# Patient Record
Sex: FEMALE | Race: WHITE
[De-identification: names, ages, dates, MRNs, and addresses within clinical notes are randomized per-mention and may not be internally consistent; named-entity substitution may affect disease eponyms.]

---

## 2022-09-21 ENCOUNTER — HOSPITAL ENCOUNTER (EMERGENCY)
Dept: HOSPITAL 47 - EC | Age: 37
Discharge: HOME | End: 2022-09-21
Payer: COMMERCIAL

## 2022-09-21 VITALS
SYSTOLIC BLOOD PRESSURE: 124 MMHG | HEART RATE: 81 BPM | RESPIRATION RATE: 18 BRPM | TEMPERATURE: 98.1 F | DIASTOLIC BLOOD PRESSURE: 82 MMHG
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
## 2022-09-21 DIAGNOSIS — S51.011A: Primary | ICD-10-CM
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
## 2022-09-21 DIAGNOSIS — S93.402A: ICD-10-CM

## 2022-09-21 PROCEDURE — 99283 EMERGENCY DEPT VISIT LOW MDM: CPT

## 2022-09-21 PROCEDURE — 12001 RPR S/N/AX/GEN/TRNK 2.5CM/<: CPT

## 2022-09-21 NOTE — XR
EXAMINATION TYPE: XR ankle complete LT

 

DATE OF EXAM: 9/21/2022 4:59 PM

 

INDICATION: 

Patient age:Female;  37 years old; 

Reason for study: fall; 

 

COMPARISON: None

 

TECHNIQUE: The  left ankle is imaged in  frontal, lateral and oblique projections.

 

FINDINGS:

Remote injury to the lateral talar head. Cortication. No evidence of acute fracture identified. There
 is soft tissue swelling around the ankle.

 

IMPRESSION: 

1. No evidence of acute fracture.

2. Subcutaneous swelling around the ankle likely secondary to underlying soft tissue injury.

3. Remote injury to the fibular head suggested.

## 2022-09-21 NOTE — ED
General Adult HPI





- General


Chief complaint: Extremity Injury, Upper


Stated complaint: fall, elbow injury


Time Seen by Provider: 09/21/22 16:31


Source: patient, RN notes reviewed, old records reviewed


Mode of arrival: ambulatory


Limitations: no limitations





- History of Present Illness


Initial comments: 





37-year-old female presenting for evaluation of laceration, injury to the right 

elbow, and left ankle.  Patient states she slipped while cleaning a bathroom 

onto brick floor.  She injured her right elbow with laceration and twisted her 

left ankle.  She had gone to see her primary care physician who had begun to 

place a suture in the elbow laceration but developed a large hematoma and could 

not provide hemorrhage control and sent the patient to the emergency department 

for evaluation.  She had a pressure dressing applied upon arrival.  She does 

have pain in the elbow and left ankle.  She previous fracture of the left ankle 

in the remote past.





- Related Data


                                    Allergies











Allergy/AdvReac Type Severity Reaction Status Date / Time


 


No Known Allergies Allergy   Verified 09/21/22 16:24














Review of Systems


ROS Statement: 


Those systems with pertinent positive or pertinent negative responses have been 

documented in the HPI.





ROS Other: All systems not noted in ROS Statement are negative.





Past Medical History


Past Medical History: No Reported History


History of Any Multi-Drug Resistant Organisms: None Reported


Past Surgical History: Tubal Ligation


Past Psychological History: No Psychological Hx Reported


Smoking Status: Never smoker


Past Alcohol Use History: None Reported


Past Drug Use History: Marijuana





General Exam


Limitations: no limitations


General appearance: alert, in no apparent distress


Head exam: Present: atraumatic, normocephalic


Eye exam: Present: normal appearance, PERRL


ENT exam: Present: normal exam


Neck exam: Present: normal inspection.  Absent: tenderness, meningismus


Respiratory exam: Present: normal lung sounds bilaterally.  Absent: respiratory 

distress, wheezes


Cardiovascular Exam: Present: regular rate, normal rhythm


GI/Abdominal exam: Absent: distended


Extremities exam: Present: joint swelling (Right posterior elbow hematoma with 1

cm stellate laceration, no active bleeding, left ankle has soft tissue swelling 

lateral malleolus no significant deformity)





Course


                                   Vital Signs











  09/21/22





  16:21


 


Temperature 98 F


 


Pulse Rate 84


 


Respiratory 16





Rate 


 


Blood Pressure 119/79


 


O2 Sat by Pulse 100





Oximetry 














Procedures





- Laceration


  ** Laceration #1


Consent Obtained: verbal consent


Indication: laceration


Site: upper extremity


Size (cm): 1


Description: stellate


Depth: arterial injury, through-and-through


Anesthetic Used: lidocaine 2%, with epi


Anesthesia Technique: local infiltration


Amount (mls): 3


Pre-repair: wound explored, irrigated extensively


Type of Sutures: nylon


Size of Sutures: 5-0


Number of Sutures: 2


Technique: simple, interrupted


Patient Tolerated Procedure: well





Medical Decision Making





- Medical Decision Making





37-year-old female with right elbow injury left ankle injury.  X-ray of the left

ankle shows a chronic deformity of the distal fibula without acute fracture.  

Patient placed in an Ace wrap regarding the left ankle.  Regarding her right 

elbow she had a hematoma and 1 cm stellate laceration over the olecranon 

process.  This was irrigated, anesthetized with lidocaine and epinephrine 

hemorrhage control was achieved.  It was cleansed with Betadine and tap water.  

2 nylon sutures were performed.  The x-ray did not show a fracture.  Patient 

will follow-up with primary care physician and have sutures removed in 10-14 

days.





Disposition


Clinical Impression: 


 Ankle sprain, Elbow contusion, Laceration





Disposition: HOME SELF-CARE


Condition: Good


Instructions (If sedation given, give patient instructions):  Ankle Sprain (ED),

Laceration (ED)


Additional Instructions: 


Please monitor elbow for signs of infection, please return for suture removal in

10-14 days.


Is patient prescribed a controlled substance at d/c from ED?: No


Referrals: 


Cristy Wynne DO [Primary Care Provider] - 1-2 days


Time of Disposition: 17:30

## 2022-09-21 NOTE — XR
EXAMINATION TYPE: XR elbow complete RT

 

DATE OF EXAM: 9/21/2022 4:59 PM

 

INDICATION: 

Patient age:Female;  37 years old; 

Reason for study: fall; [facility]. 

 

COMPARISON: None

 

TECHNIQUE: The right elbow was examined in AP, lateral, and oblique projections. 

 

FINDINGS: There is soft tissue swelling over the olecranon process. No evidence of acute fracture.

 

IMPRESSION:

Soft tissue swelling over the olecranon without definite evidence for fracture. If there remains conc
dale consider CT evaluation.

## 2023-01-19 ENCOUNTER — HOSPITAL ENCOUNTER (EMERGENCY)
Dept: HOSPITAL 47 - EC | Age: 38
Discharge: HOME | End: 2023-01-19
Payer: COMMERCIAL

## 2023-01-19 VITALS — SYSTOLIC BLOOD PRESSURE: 106 MMHG | DIASTOLIC BLOOD PRESSURE: 63 MMHG | HEART RATE: 77 BPM

## 2023-01-19 VITALS — TEMPERATURE: 97.9 F

## 2023-01-19 VITALS — RESPIRATION RATE: 18 BRPM

## 2023-01-19 DIAGNOSIS — F12.90: ICD-10-CM

## 2023-01-19 DIAGNOSIS — N13.2: Primary | ICD-10-CM

## 2023-01-19 LAB
ALBUMIN SERPL-MCNC: 4.1 G/DL (ref 3.5–5)
ALP SERPL-CCNC: 47 U/L (ref 38–126)
ALT SERPL-CCNC: 23 U/L (ref 4–34)
AMYLASE SERPL-CCNC: 48 U/L (ref 30–110)
ANION GAP SERPL CALC-SCNC: 7 MMOL/L
AST SERPL-CCNC: 27 U/L (ref 14–36)
BASOPHILS # BLD AUTO: 0 K/UL (ref 0–0.2)
BASOPHILS NFR BLD AUTO: 0 %
BUN SERPL-SCNC: 21 MG/DL (ref 7–17)
CALCIUM SPEC-MCNC: 8.8 MG/DL (ref 8.4–10.2)
CHLORIDE SERPL-SCNC: 107 MMOL/L (ref 98–107)
CO2 SERPL-SCNC: 24 MMOL/L (ref 22–30)
EOSINOPHIL # BLD AUTO: 0.2 K/UL (ref 0–0.7)
EOSINOPHIL NFR BLD AUTO: 3 %
ERYTHROCYTE [DISTWIDTH] IN BLOOD BY AUTOMATED COUNT: 4.38 M/UL (ref 3.8–5.4)
ERYTHROCYTE [DISTWIDTH] IN BLOOD: 12.6 % (ref 11.5–15.5)
GLUCOSE SERPL-MCNC: 121 MG/DL (ref 74–99)
HCT VFR BLD AUTO: 39 % (ref 34–46)
HGB BLD-MCNC: 13.1 GM/DL (ref 11.4–16)
LIPASE SERPL-CCNC: 104 U/L (ref 23–300)
LYMPHOCYTES # SPEC AUTO: 1.9 K/UL (ref 1–4.8)
LYMPHOCYTES NFR SPEC AUTO: 33 %
MCH RBC QN AUTO: 29.9 PG (ref 25–35)
MCHC RBC AUTO-ENTMCNC: 33.6 G/DL (ref 31–37)
MCV RBC AUTO: 89.1 FL (ref 80–100)
MONOCYTES # BLD AUTO: 0.3 K/UL (ref 0–1)
MONOCYTES NFR BLD AUTO: 5 %
NEUTROPHILS # BLD AUTO: 3.3 K/UL (ref 1.3–7.7)
NEUTROPHILS NFR BLD AUTO: 58 %
PH UR: 5.5 [PH] (ref 5–8)
PLATELET # BLD AUTO: 203 K/UL (ref 150–450)
POTASSIUM SERPL-SCNC: 4.3 MMOL/L (ref 3.5–5.1)
PROT SERPL-MCNC: 6.7 G/DL (ref 6.3–8.2)
PROT UR QL: (no result)
RBC UR QL: 11 /HPF (ref 0–5)
SODIUM SERPL-SCNC: 138 MMOL/L (ref 137–145)
SP GR UR: 1.04 (ref 1–1.03)
SQUAMOUS UR QL AUTO: 27 /HPF (ref 0–4)
UROBILINOGEN UR QL STRIP: <2 MG/DL (ref ?–2)
WBC # BLD AUTO: 5.8 K/UL (ref 3.8–10.6)
WBC # UR AUTO: 28 /HPF (ref 0–5)

## 2023-01-19 PROCEDURE — 81001 URINALYSIS AUTO W/SCOPE: CPT

## 2023-01-19 PROCEDURE — 96375 TX/PRO/DX INJ NEW DRUG ADDON: CPT

## 2023-01-19 PROCEDURE — 96374 THER/PROPH/DIAG INJ IV PUSH: CPT

## 2023-01-19 PROCEDURE — 84703 CHORIONIC GONADOTROPIN ASSAY: CPT

## 2023-01-19 PROCEDURE — 82150 ASSAY OF AMYLASE: CPT

## 2023-01-19 PROCEDURE — 85025 COMPLETE CBC W/AUTO DIFF WBC: CPT

## 2023-01-19 PROCEDURE — 96361 HYDRATE IV INFUSION ADD-ON: CPT

## 2023-01-19 PROCEDURE — 74176 CT ABD & PELVIS W/O CONTRAST: CPT

## 2023-01-19 PROCEDURE — 87086 URINE CULTURE/COLONY COUNT: CPT

## 2023-01-19 PROCEDURE — 83690 ASSAY OF LIPASE: CPT

## 2023-01-19 PROCEDURE — 99284 EMERGENCY DEPT VISIT MOD MDM: CPT

## 2023-01-19 PROCEDURE — 80053 COMPREHEN METABOLIC PANEL: CPT

## 2023-01-19 PROCEDURE — 36415 COLL VENOUS BLD VENIPUNCTURE: CPT

## 2023-01-19 NOTE — CT
EXAMINATION TYPE: CT abdomen pelvis wo con

 

DATE OF EXAM: 1/19/2023

 

COMPARISON: None

 

INDICATION: abd pain

 

DLP: 537.8 mGycm, Automated exposure control for dose reduction was used.

 

CONTRAST:  0 mL of . 

                        Study performed without Oral Contrast

 

TECHNIQUE: Axial images were obtained from above the diaphragm to the pubic rami in the axial plane a
t 5 mm thick sections.  Reconstructed images are reviewed on the computer in the coronal plane.  

 

FINDINGS:

 

Limited CT sections are obtained the lung bases.  The lung bases are clear.

 

CT ABDOMEN:

 

Liver: Normal

 

Spleen: Normal

 

Pancreas: Normal

 

Adrenal glands: The adrenal glands are normal.

 

Gallbladder: Normal  

 

Kidneys: No masses are evident. No hydronephrosis is present.   No cysts are present.  There are philipp
ral fine calcifications within the cortical medullary junction of the left kidney. Minimal left hydro
nephrosis may be present. Some minimal perinephric fluid may be present between the kidney and psoas 
muscle. At the left ureterovesical junction there is a small 0.3 rounded calcification could be a dis
nimesh ureteral stone. There are additional phleboliths within the pelvis which are within the different
ial. Correlate with symptoms

 

Aorta: Vascular calcification is within the aorta. 

 

Inferior vena cava: Normal.

 

CT PELVIS: 

Fecal debris is:. No dilated small bowel loops are evident. Study as well as oral contrast material i
n bowel evaluation.

 

Appendix: Normal as visualized.

 

Urinary bladder: Decompressed limiting evaluation. 

 

Genitourinary structures: Uterus is normal. Adnexa are normal.

 

Osseous structures: No suspicious lytic or sclerotic lesions. Small bone island may be within the rig
ht femoral head lateral right sacrum.

 

IMPRESSIONS:

1.  Several punctate left-sided renal stones. Correlate for medullary sponge kidney.

2. Mild left hydronephrosis. An obstructing calcification is not identified. Distal left 0.3 cm urete
ral stone is not excluded. Phleboliths are within the differential.

## 2023-01-19 NOTE — ED
General Adult HPI





<Marbin Gallego - Last Filed: 01/19/23 09:10>





- General


Source: patient, RN notes reviewed, old records reviewed


Mode of arrival: ambulatory


Limitations: no limitations





<John Olmos - Last Filed: 01/19/23 21:24>





- General


Chief complaint: Abdominal Pain


Stated complaint: Abdominal Pain, Kidney Stones


Time Seen by Provider: 01/19/23 05:40





- History of Present Illness


Initial comments: 





Patient is a 37-year-old female presents emergency Department complaining of 

right flank pain.  She does have a history of kidney stones.  States it feels 

like that.  Denies any dysuria or hematuria.  Pain started suddenly at 

approximately 3 AM.  Describes as a sharp sensation that radiates from her left 

CVA over her flank towards her groin.  Denies any diarrhea, vaginal discharge, 

vaginal bleeding.  Does not believe she is pregnant.  Does endorse some nausea. 

Denies emesis.  Denies chest pain or shortness of breath.  Denies any fevers.  

No other acute complaints at this time.  He presents for further evaluation at 

this time over concern for possible kidney stone. (John Olmos)





- Related Data


                                  Previous Rx's











 Medication  Instructions  Recorded


 


Ketorolac [Toradol] 10 mg PO Q6HR PRN #15 tab 01/19/23


 


Metoclopramide HCl [Reglan] 10 mg PO Q6HR PRN #15 tablet 01/19/23











                                    Allergies











Allergy/AdvReac Type Severity Reaction Status Date / Time


 


No Known Allergies Allergy   Verified 01/19/23 05:19














Review of Systems


ROS Other: All systems not noted in ROS Statement are negative.





<Marbin Gallego - Last Filed: 01/19/23 09:10>


ROS Other: All systems not noted in ROS Statement are negative.





<John Olmos - Last Filed: 01/19/23 21:24>


ROS Statement: 


Those systems with pertinent positive or pertinent negative responses have been 

documented in the HPI.





Review of Systems:


CONST: Denies fever 


EYES: Denies blurry vision 


ENT: Denies nasal congestion  


C/V:  Denies Chest pain


RESP: Denies shortness of breath 


GI: Endorses abdominal pain, left flank pain.


: Denies dysuria  


SKIN: Denies rash.


MSK: Denies joint pain.


NEURO: Denies headache  (John Olmos)





Past Medical History


Past Medical History: No Reported History


History of Any Multi-Drug Resistant Organisms: None Reported


Past Surgical History: Tubal Ligation


Past Psychological History: No Psychological Hx Reported


Smoking Status: Never smoker


Past Alcohol Use History: None Reported


Past Drug Use History: Marijuana





<John Olmos - Last Filed: 01/19/23 21:24>





General Exam


Limitations: no limitations





<John Olmos - Last Filed: 01/19/23 21:24>





- General Exam Comments


Initial Comments: 





General: She is in moderate distress secondary to left flank pain.


HEAD:  Normal with no signs of head trauma.


EYES:  PERRLA, EOMI, conjunctiva normal, no discharge.


ENT:  Hearing grossly intact, normal oropharynx.


RESPIRATORY:  Clear breath sounds bilaterally.  No wheezes, rales, or rhonchi.  


C/V:  Regular rate and rhythm. S1 and S2 auscultated, no edema, peripheral 

pulses 2+ and intact throughout


ABD: Abdomen soft, nondistended.  Tender to palpation in the left flank, left 

CVA tenderness to palpation and percussion.  Mild left lower quadrant abdominal 

discomfort mostly in the left flank.  No guarding.  No rebound tenderness.  No 

peritoneal signs.


EXT: Normal range of motion, no obvious deformity


SKIN:  No rashes or lesions observed on exposed skin.


NEURO: Alert and oriented 4. (John Olmos)





Course


                                   Vital Signs











  01/19/23 01/19/23 01/19/23





  05:19 07:28 08:40


 


Temperature 97.9 F  


 


Pulse Rate 59 L 82 89


 


Respiratory 18 24 18





Rate   


 


Blood Pressure 117/77 120/84 106/68


 


O2 Sat by Pulse 98 100 98





Oximetry   














  01/19/23





  09:28


 


Temperature 97.9 F


 


Pulse Rate 77


 


Respiratory 18





Rate 


 


Blood Pressure 106/63


 


O2 Sat by Pulse 97





Oximetry 














Medical Decision Making





- Lab Data


Result diagrams: 


                                 01/19/23 05:50





                                 01/19/23 05:50





<Marbin Gallego - Last Filed: 01/19/23 09:10>





- Lab Data


Result diagrams: 


                                 01/19/23 05:50





                                 01/19/23 05:50





<John Olmos - Last Filed: 01/19/23 21:24>





- Medical Decision Making





Was patient admitted / discharged? Hospital course, mention meds given and 

route, prescriptions, significant lab abnormalities, going to OR and other 

pertinent info.


@  -Patient reevaluated twice.  Patient is feeling much better.  Patient and 

family are updated on results and plan.  Patient will be discharged with follow-

up with urology. 


Undiagnosed new problem with uncertain prognosis?


@  -No


Drug Therapy requiring intensive monitoring for toxicity (Heparin, Nitro, 

Insulin, Cardizem)?


@  -No


Were any procedures done?


@  -No


Diagnosis/symptom?


@  -Ureterolithiasis


Acute, or Chronic, or Acute on Chronic?


@  -Acute


Uncomplicated (without systemic symptoms) or Complicated (systemic symptoms)?


@  -Uncomplicated


Side effects of treatment?


@  -No


Exacerbation, Progression, or Severe Exacerbation?


@  -No


Poses a threat to life or bodily function? How? (Chest pain, USA, MI, pneumonia,

 PE, COPD, DKA, ARF, appy, cholecystitis, CVA, Diverticulitis, Homicidal, 

Suicidal, threat to staff... and all critical care pts)


@  -No (Marbin Gallego)








Was pt. sent in by a medical professional or institution (, PA, NP, urgent ca

re, hospital, or nursing home...) When possible be specific


@  -No


Did you speak to anyone other than the patient for history (EMS, parent, family,

 police, friend...)? What history was obtained from this source 


@  -No


Did you review nursing and triage notes (agree or disagree)?  Why? 


@  -I reviewed and agree with nursing and triage notes


Were old charts reviewed (outside hosp., previous admission, EMS record, old 

EKG, old radiological studies, urgent care reports/EKG's, nursing home records)?

 Report findings 


@  -No old charts were reviewed


Differential Diagnosis (chest pain, altered mental status, abdominal pain women,

 abdominal pain men, vaginal bleeding, weakness, fever, dyspnea, syncope, 

headache, dizziness, GI bleed, back pain, seizure, CVA, palpatations, mental 

health)? 


@  -Differential Abdominal Pain Women:


Appendicitis, Cholecystitis, diverticulosis, ischemic bowel, pancreatitis, 

hepatitis, UTI, gastroenteritis, AAA, incarcerated hernia, bowel obstruction, 

constipation, inflammatory bowel, hepatitis, peptic ulcer disease, splenic 

infarction, perforated viscus, vulvitis, ovarian torsion, PID, kidney stone, 

placenta abruption, this is not meant to be an all-inclusive list





EKG interpreted by me (3pts min.).


@  -None done


X-rays interpreted by me (1pt min.).


@  -None done


CT interpreted by me (1pt min.).


@  -Pending


U/S interpreted by me (1pt. min.).


@  -None done


What testing was considered but not performed or refused? (CT, X-rays, U/S, 

labs)? Why?


@  -None


What meds were considered but not given or refused? Why?


@  -None


Did you discuss the management of the patient with other professionals 

(professionals i.e. , PA, NP, lab, RT, psych nurse, , , 

teacher, , )? Give summary


@  -No


Was smoking cessation discussed for >3mins.?


@  -No


Was critical care preformed (if so, how long)?


@  -No


Were there social determinants of health that impacted care today? How? 

(Homelessness, low income, unemployed, alcoholism, drug addiction, 

transportation, low edu. Level, literacy, decrease access to med. care, intermediate, 

rehab)?


@  -No


Was there de-escalation of care discussed even if they declined (Discuss DNR or 

withdrawal of care, Hospice)? DNR status


@  -No


What co-morbidities impacted this encounter? (DM, HTN, Smoking, COPD, CAD, 

Cancer, CVA, ARF, Chemo, Hep., AIDS, mental health diagnosis, sleep apnea, 

morbid obesity)?


@  -None


Was patient admitted / discharged? Hospital course, mention meds given and 

route, prescriptions, significant lab abnormalities, going to OR and other 

pertinent info.


@  -Based on the patient's presentation and physical exam, I'm concerned for an 

abdominal process for current symptoms.  Based be a kidney stone but cannot rule

 out other etiology at this time.  We will obtain abdominal laboratory studies 

as well as urine studies.  CT without contrast will be obtained.  She'll be 

given IV fluids, as well as IV morphine, Zofran, Protonix.  Patient was in 

agreement this plan.  Vital signs within acceptable limits.





Patient's laboratory studies are remarkable for negative pregnancy test.  

Urinalysis is a contaminated catch with 27 squamous cells.  Remainder of the 

labs are unremarkable.  Kidney function within acceptable limits.





CT imaging is pending at this time.  Care was transitioned to Dr. Gallego. 

(John Olmos)





- Lab Data


                                   Lab Results











  01/19/23 01/19/23 01/19/23 Range/Units





  05:50 05:50 05:50 


 


WBC  5.8    (3.8-10.6)  k/uL


 


RBC  4.38    (3.80-5.40)  m/uL


 


Hgb  13.1    (11.4-16.0)  gm/dL


 


Hct  39.0    (34.0-46.0)  %


 


MCV  89.1    (80.0-100.0)  fL


 


MCH  29.9    (25.0-35.0)  pg


 


MCHC  33.6    (31.0-37.0)  g/dL


 


RDW  12.6    (11.5-15.5)  %


 


Plt Count  203    (150-450)  k/uL


 


MPV  8.5    


 


Neutrophils %  58    %


 


Lymphocytes %  33    %


 


Monocytes %  5    %


 


Eosinophils %  3    %


 


Basophils %  0    %


 


Neutrophils #  3.3    (1.3-7.7)  k/uL


 


Lymphocytes #  1.9    (1.0-4.8)  k/uL


 


Monocytes #  0.3    (0-1.0)  k/uL


 


Eosinophils #  0.2    (0-0.7)  k/uL


 


Basophils #  0.0    (0-0.2)  k/uL


 


Sodium    138  (137-145)  mmol/L


 


Potassium    4.3  (3.5-5.1)  mmol/L


 


Chloride    107  ()  mmol/L


 


Carbon Dioxide    24  (22-30)  mmol/L


 


Anion Gap    7  mmol/L


 


BUN    21 H  (7-17)  mg/dL


 


Creatinine    0.77  (0.52-1.04)  mg/dL


 


Est GFR (CKD-EPI)AfAm    >90  (>60 ml/min/1.73 sqM)  


 


Est GFR (CKD-EPI)NonAf    >90  (>60 ml/min/1.73 sqM)  


 


Glucose    121 H  (74-99)  mg/dL


 


Calcium    8.8  (8.4-10.2)  mg/dL


 


Total Bilirubin    0.3  (0.2-1.3)  mg/dL


 


AST    27  (14-36)  U/L


 


ALT    23  (4-34)  U/L


 


Alkaline Phosphatase    47  ()  U/L


 


Total Protein    6.7  (6.3-8.2)  g/dL


 


Albumin    4.1  (3.5-5.0)  g/dL


 


Amylase    48  ()  U/L


 


Lipase    104  ()  U/L


 


HCG, Qual    Not Detected  


 


Urine Color   Yellow   


 


Urine Appearance   Turbid H   (Clear)  


 


Urine pH   5.5   (5.0-8.0)  


 


Ur Specific Gravity   1.040 H   (1.001-1.035)  


 


Urine Protein   1+ H   (Negative)  


 


Urine Glucose (UA)   Negative   (Negative)  


 


Urine Ketones   Negative   (Negative)  


 


Urine Blood   Small H   (Negative)  


 


Urine Nitrite   Negative   (Negative)  


 


Urine Bilirubin   Negative   (Negative)  


 


Urine Urobilinogen   <2.0   (<2.0)  mg/dL


 


Ur Leukocyte Esterase   Large H   (Negative)  


 


Urine RBC   11 H   (0-5)  /hpf


 


Urine WBC   28 H   (0-5)  /hpf


 


Ur Squamous Epith Cells   27 H   (0-4)  /hpf


 


Urine Bacteria   Few H   (None)  /hpf


 


Urine Mucus   Few H   (None)  /hpf














- Radiology Data


Interpreted by me: 





Computed tomography scan abdomen pelvis shows left-sided renal stones.  

Correlate for medullary sponge kidney.  Mild left eye droop.  Possible distal 

left 0.3 cm ureteral stone. (Marbin Gallego)





Disposition


Is patient prescribed a controlled substance at d/c from ED?: No


Time of Disposition: 09:12





<Marbin Gallego - Last Filed: 01/19/23 09:10>





<John Olmos - Last Filed: 01/19/23 21:24>


Clinical Impression: 


 Ureterolithiasis





Disposition: HOME SELF-CARE


Condition: Stable


Instructions (If sedation given, give patient instructions):  Kidney Stones (ED)


Additional Instructions: 


Please do follow-up with primary care physician in the next couple days for 

recheck.  Please also follow-up with urology and discussed computed tomography 

scan, number provided.  Return for fever, increased pain, uncontrolled vomiting,

 worsening or change in symptoms or other concerns.  Prescriptions at the 

pharmacy.


Prescriptions: 


Metoclopramide HCl [Reglan] 10 mg PO Q6HR PRN #15 tablet


 PRN Reason: Nausea


Ketorolac [Toradol] 10 mg PO Q6HR PRN #15 tab


 PRN Reason: Pain


Referrals: 


Cristy Wynne DO [Primary Care Provider] - 1-2 days


Bull Wilson MD [STAFF PHYSICIAN] - 1-2 days